# Patient Record
Sex: MALE | Race: ASIAN | NOT HISPANIC OR LATINO | URBAN - METROPOLITAN AREA
[De-identification: names, ages, dates, MRNs, and addresses within clinical notes are randomized per-mention and may not be internally consistent; named-entity substitution may affect disease eponyms.]

---
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
## 2024-01-25 ENCOUNTER — ANCILLARY PROCEDURE (OUTPATIENT)
Dept: NEUROLOGY | Facility: CLINIC | Age: 19
End: 2024-01-25
Payer: COMMERCIAL

## 2024-01-25 DIAGNOSIS — R56.9 SEIZURES (H): ICD-10-CM

## 2024-01-25 PROCEDURE — 95713 VEEG 2-12 HR CONT MNTR: CPT | Performed by: PSYCHIATRY & NEUROLOGY

## 2024-01-25 PROCEDURE — 95718 EEG PHYS/QHP 2-12 HR W/VEEG: CPT | Performed by: PSYCHIATRY & NEUROLOGY

## 2024-01-25 PROCEDURE — 95700 EEG CONT REC W/VID EEG TECH: CPT | Performed by: PSYCHIATRY & NEUROLOGY

## 2024-02-02 ENCOUNTER — PRE VISIT (OUTPATIENT)
Dept: NEUROLOGY | Facility: CLINIC | Age: 19
End: 2024-02-02

## 2024-02-02 ENCOUNTER — APPOINTMENT (OUTPATIENT)
Dept: LAB | Facility: CLINIC | Age: 19
End: 2024-02-02
Payer: COMMERCIAL

## 2024-02-02 ENCOUNTER — OFFICE VISIT (OUTPATIENT)
Dept: NEUROLOGY | Facility: CLINIC | Age: 19
End: 2024-02-02
Attending: INTERNAL MEDICINE
Payer: COMMERCIAL

## 2024-02-02 VITALS
OXYGEN SATURATION: 98 % | HEART RATE: 60 BPM | SYSTOLIC BLOOD PRESSURE: 112 MMHG | RESPIRATION RATE: 18 BRPM | DIASTOLIC BLOOD PRESSURE: 73 MMHG

## 2024-02-02 DIAGNOSIS — G40.909 SEIZURE DISORDER (H): ICD-10-CM

## 2024-02-02 LAB — LEVETIRACETAM SERPL-MCNC: 15.3 ΜG/ML (ref 10–40)

## 2024-02-02 PROCEDURE — 99205 OFFICE O/P NEW HI 60 MIN: CPT | Performed by: PSYCHIATRY & NEUROLOGY

## 2024-02-02 PROCEDURE — 80175 DRUG SCREEN QUAN LAMOTRIGINE: CPT | Mod: 90 | Performed by: PATHOLOGY

## 2024-02-02 PROCEDURE — 99000 SPECIMEN HANDLING OFFICE-LAB: CPT | Performed by: PATHOLOGY

## 2024-02-02 PROCEDURE — G2211 COMPLEX E/M VISIT ADD ON: HCPCS | Performed by: PSYCHIATRY & NEUROLOGY

## 2024-02-02 PROCEDURE — 36415 COLL VENOUS BLD VENIPUNCTURE: CPT | Performed by: PATHOLOGY

## 2024-02-02 PROCEDURE — 80177 DRUG SCRN QUAN LEVETIRACETAM: CPT | Performed by: PSYCHIATRY & NEUROLOGY

## 2024-02-02 RX ORDER — LAMOTRIGINE 25 MG/1
75 TABLET ORAL 2 TIMES DAILY
Qty: 180 TABLET | Refills: 11 | Status: SHIPPED | OUTPATIENT
Start: 2024-02-02 | End: 2024-02-18

## 2024-02-02 RX ORDER — LAMOTRIGINE 50 MG/1
TABLET, ORALLY DISINTEGRATING ORAL 2 TIMES DAILY
COMMUNITY
End: 2024-02-02

## 2024-02-02 RX ORDER — LEVETIRACETAM 500 MG/1
500 TABLET ORAL 2 TIMES DAILY
COMMUNITY
End: 2024-02-02

## 2024-02-02 RX ORDER — LAMOTRIGINE 25 MG/1
75 TABLET ORAL DAILY
Qty: 180 TABLET | Refills: 11 | Status: SHIPPED | OUTPATIENT
Start: 2024-02-02 | End: 2024-02-02

## 2024-02-02 RX ORDER — LEVETIRACETAM 500 MG/1
500 TABLET ORAL 2 TIMES DAILY
Qty: 60 TABLET | Refills: 11 | Status: SHIPPED | OUTPATIENT
Start: 2024-02-02 | End: 2024-05-01

## 2024-02-02 ASSESSMENT — PAIN SCALES - GENERAL: PAINLEVEL: NO PAIN (0)

## 2024-02-02 NOTE — PATIENT INSTRUCTIONS
PLAN:  MRI brain  Continue Keppra 500 mg bid and Lamictal 75 mg bid.  Labs: Keppra, Lamictal  RTC in 3 months.

## 2024-02-02 NOTE — LETTER
2/2/2024       RE: Roslyn Toney  [address]  [facility] [address]       Dear Colleague,    Thank you for referring your patient, Roslyn Toney, to the [facility] NEUROLOGY CLINIC [place] [facility]. Please see a copy of my visit note below.    February 2, 2024    CHIEF COMPLAINT:  Seizures.    HISTORY OF PRESENT ILLNESS:  This patient is a 18 year old years old right handed male presented for evaluation for seizure activities.  Patient is by himself in the clinic today.    Patient started to have seizures at 1-1 years old while he was in [place]. He was put on anti-seizure medications, and his seizures were well controlled at that time. He was taken off AEDs when he was in middle school. Then his seizures recurred in June 2021, when he had a GTC. He had 3-4 seizures between 2021 to 2022. Last seizure with LOC was in Aug. 2022. He was initially put on Keppra 500 mg bid and seizure was still not controlled. Later Lamictal was added and seizures was controlled. He is compliant with the meds. No side effects were observed. He is now taking Keppra 500 mg bid and Lamictal 75 mg bid.    Patient has 2 types of seizures.  Type # 1 is likely absence seizures. These are described as he will star, having behavioral arrest, this will last for 1 min, no post ictal confusion or lethargy. The frequency of this is unclear.  Type # 2 is likely GTCs. They are described as he will make loud noise, lose consciousness, his head will turn to one side, forming in the mouth, with generalized convulsions. This happened 3-4 times from 4451-8316.  The last seizure was in Aug. 2022.    TRIGGERS FOR SEIZURES:    Lack of sleep, flashing lights. Possible stress.    RISK FACTORS FOR SEIZURES:  No history of head trauma with loss of consciousness, no history of CNS infection, no history of febrile convulsions.  No history of brain tumor or stroke. Normal  development.     ALLERGIES:  Patient has no known allergies.    CURRENT MEDICATIONS:  Current Outpatient Medications   Medication Sig Dispense Refill    lamoTRIgine (LAMICTAL) 50 MG TBDP ODT tab Take by mouth 2 times daily      levETIRAcetam (KEPPRA) 500 MG tablet Take 500 mg by mouth 2 times daily         PAST AEDs:  One AEDs during childhood    PAST MEDICAL HISTORY:  Epilepsy    PAST SURGICAL HISTORY:  None    Family History:  History of seizures: None    SOCIAL HISTORY:  Born and raised: [place]   Education: In college now at [employer]  Work: None  Tobacco: None , ETOH: None, Drugs: None    Family: Not , Children: None    REVIEW OF SYSTEMS:   Negative       PHYSICAL EXAMINATIONS:       Blood pressure 112/73, pulse 60, resp. rate 18, SpO2 98%.    General exam: General Appearance:  No acute distress.  HEENT:  Normocephalic, atraumatic.  Neck:  Supple, no lymphadenopathy. Extremities:  No edema.      Neurologic Exam:  Alert and oriented x3.  Speech fluent, appropriate. Normal attention.  Cranial Nerves:  Pupils are equal, round, reactive to light and accomodation.  Extraocular movement intact.  No facial weakness or asymmetry.  Facial sensation was normal.  Tongue and palate midline.  Hearing normal.  Visual field : normal to confrontation.   Motor Exam:  Normal bulk.  Normal tone.  Strength 5/5 in all extremities.  Sensory:  Normal to light touch and vibration in all extremities.  Deep tendon reflexes 2+ bilaterally in both upper and lower extremities.  Coordination:  Finger-to-nose, heel-to-shin exams showed no ataxia.  Rapid alternating movement was normal.  Gait and Station:  normal casual gait.  Normal tandem gait. No difficulties with tip-toe or heel walking.  Romberg sign negative.      PREVIOUS DIAGNOSTIC TESTING:    MRI brain:   Result not available.    EEG: (1/2024)  Impression:  This is abnormal EEG due to the presence of one isolated central spike and wave activities. Findings are consistent with  patient's history of seizure disorder.  This isolated central spike and wave activities may suggest generalized epilepsy. However, focal epilepsy can not be ruled out at this time.       IMPRESSION:     Epilepsy. Probably primary generalized epilepsy. This patient is a 18 year old years old right handed male presented with history of epilepsy since childhood. He had probably absence seizures and GTCs. His seizures are relatively well controlled. He is currently taking Keppra 500 mg bid and Lamictal 75 mg bid.     We would like to have MRI for further evaluation.      PLAN:  MRI brain  Continue Keppra 500 mg bid and Lamictal 75 mg bid.  Labs: Keppra, Lamictal  RTC in 3 months.      60 min total time was spent on the day of this visit.      40 min was spent on face to face time  20 min was spent on preparation of visit to review charts and labs, ordering medications and tests, and documentation of clinical information2        Again, thank you for allowing me to participate in the care of your patient.      Sincerely,    Alonso Natarajan MD

## 2024-02-02 NOTE — PROGRESS NOTES
February 2, 2024    CHIEF COMPLAINT:  Seizures.    HISTORY OF PRESENT ILLNESS:  This patient is a 18 year old years old right handed male presented for evaluation for seizure activities.  Patient is by himself in the clinic today.    Patient started to have seizures at 1-2 years old while he was in [place]. He was put on anti-seizure medications, and his seizures were well controlled at that time. He was taken off AEDs when he was in middle school. Then his seizures recurred in June 2021, when he had a GTC. He had 3-4 seizures between 2021 to 2022. Last seizure with LOC was in Aug. 2022. He was initially put on Keppra 500 mg bid and seizure was still not controlled. Later Lamictal was added and seizures was controlled. He is compliant with the meds. No side effects were observed. He is now taking Keppra 500 mg bid and Lamictal 75 mg bid.    Patient has 2 types of seizures.  Type # 1 is likely absence seizures. These are described as he will star, having behavioral arrest, this will last for 1 min, no post ictal confusion or lethargy. The frequency of this is unclear.  Type # 2 is likely GTCs. They are described as he will make loud noise, lose consciousness, his head will turn to one side, forming in the mouth, with generalized convulsions. This happened 3-4 times from 0587-7605.  The last seizure was in Aug. 2022.    TRIGGERS FOR SEIZURES:    Lack of sleep, flashing lights. Possible stress.    RISK FACTORS FOR SEIZURES:  No history of head trauma with loss of consciousness, no history of CNS infection, no history of febrile convulsions.  No history of brain tumor or stroke. Normal development.     ALLERGIES:  Patient has no known allergies.    CURRENT MEDICATIONS:  Current Outpatient Medications   Medication Sig Dispense Refill    lamoTRIgine (LAMICTAL) 50 MG TBDP ODT tab Take by mouth 2 times daily      levETIRAcetam (KEPPRA) 500 MG tablet Take 500 mg by mouth 2 times daily         PAST AEDs:  One AEDs during  childhood    PAST MEDICAL HISTORY:  Epilepsy    PAST SURGICAL HISTORY:  None    Family History:  History of seizures: None    SOCIAL HISTORY:  Born and raised: [place]   Education: In college now at [employer]  Work: None  Tobacco: None , ETOH: None, Drugs: None    Family: Not , Children: None    REVIEW OF SYSTEMS:   Negative       PHYSICAL EXAMINATIONS:       Blood pressure 112/73, pulse 60, resp. rate 18, SpO2 98%.    General exam: General Appearance:  No acute distress.  HEENT:  Normocephalic, atraumatic.  Neck:  Supple, no lymphadenopathy. Extremities:  No edema.      Neurologic Exam:  Alert and oriented x3.  Speech fluent, appropriate. Normal attention.  Cranial Nerves:  Pupils are equal, round, reactive to light and accomodation.  Extraocular movement intact.  No facial weakness or asymmetry.  Facial sensation was normal.  Tongue and palate midline.  Hearing normal.  Visual field : normal to confrontation.   Motor Exam:  Normal bulk.  Normal tone.  Strength 5/5 in all extremities.  Sensory:  Normal to light touch and vibration in all extremities.  Deep tendon reflexes 2+ bilaterally in both upper and lower extremities.  Coordination:  Finger-to-nose, heel-to-shin exams showed no ataxia.  Rapid alternating movement was normal.  Gait and Station:  normal casual gait.  Normal tandem gait. No difficulties with tip-toe or heel walking.  Romberg sign negative.      PREVIOUS DIAGNOSTIC TESTING:    MRI brain:   Result not available.    EEG: (1/2024)  Impression:  This is abnormal EEG due to the presence of one isolated central spike and wave activities. Findings are consistent with patient's history of seizure disorder.  This isolated central spike and wave activities may suggest generalized epilepsy. However, focal epilepsy can not be ruled out at this time.       IMPRESSION:     Epilepsy. Probably primary generalized epilepsy. This patient is a 18 year old years old right handed male presented with history of  epilepsy since childhood. He had probably absence seizures and GTCs. His seizures are relatively well controlled. He is currently taking Keppra 500 mg bid and Lamictal 75 mg bid.     We would like to have MRI for further evaluation.      PLAN:  MRI brain  Continue Keppra 500 mg bid and Lamictal 75 mg bid.  Labs: Keppra, Lamictal  RTC in 3 months.        Alonso Natarajan MD      60 min total time was spent on the day of this visit.      40 min was spent on face to face time  20 min was spent on preparation of visit to review charts and labs, ordering medications and tests, and documentation of clinical information2

## 2024-02-04 LAB — LAMOTRIGINE SERPL-MCNC: 4.9 UG/ML

## 2024-02-18 ENCOUNTER — TELEPHONE (OUTPATIENT)
Dept: NEUROLOGY | Facility: CLINIC | Age: 19
End: 2024-02-18
Payer: COMMERCIAL

## 2024-02-18 DIAGNOSIS — G40.909 SEIZURE DISORDER (H): Primary | ICD-10-CM

## 2024-02-18 RX ORDER — LAMOTRIGINE 100 MG/1
100 TABLET ORAL DAILY
Qty: 180 TABLET | Refills: 3 | Status: SHIPPED | OUTPATIENT
Start: 2024-02-18 | End: 2024-05-01

## 2024-02-18 NOTE — TELEPHONE ENCOUNTER
Patient called. He had another spell of LOC last night. He woke up on the floor. He was sleeping late last night. This happed at 1:40 am. He usually sleeps at 12:00.    Recommendations:  Increase Lamictal 100 mg bid.  Continue Keppra 500 mg bid

## 2024-02-28 ENCOUNTER — ANCILLARY PROCEDURE (OUTPATIENT)
Dept: MRI IMAGING | Facility: CLINIC | Age: 19
End: 2024-02-28
Attending: PSYCHIATRY & NEUROLOGY
Payer: COMMERCIAL

## 2024-02-28 PROCEDURE — A9585 GADOBUTROL INJECTION: HCPCS | Performed by: RADIOLOGY

## 2024-02-28 PROCEDURE — 70553 MRI BRAIN STEM W/O & W/DYE: CPT | Mod: GC | Performed by: RADIOLOGY

## 2024-02-28 RX ORDER — GADOBUTROL 604.72 MG/ML
7.5 INJECTION INTRAVENOUS ONCE
Status: COMPLETED | OUTPATIENT
Start: 2024-02-28 | End: 2024-02-28

## 2024-02-28 RX ADMIN — GADOBUTROL 6 ML: 604.72 INJECTION INTRAVENOUS at 18:45

## 2024-03-10 ENCOUNTER — HEALTH MAINTENANCE LETTER (OUTPATIENT)
Age: 19
End: 2024-03-10

## 2024-05-01 ENCOUNTER — APPOINTMENT (OUTPATIENT)
Dept: LAB | Facility: CLINIC | Age: 19
End: 2024-05-01
Payer: COMMERCIAL

## 2024-05-01 ENCOUNTER — OFFICE VISIT (OUTPATIENT)
Dept: NEUROLOGY | Facility: CLINIC | Age: 19
End: 2024-05-01
Payer: COMMERCIAL

## 2024-05-01 VITALS
OXYGEN SATURATION: 100 % | DIASTOLIC BLOOD PRESSURE: 75 MMHG | HEART RATE: 72 BPM | WEIGHT: 139 LBS | SYSTOLIC BLOOD PRESSURE: 116 MMHG

## 2024-05-01 DIAGNOSIS — G40.909 SEIZURE DISORDER (H): Primary | ICD-10-CM

## 2024-05-01 PROCEDURE — 80177 DRUG SCRN QUAN LEVETIRACETAM: CPT | Performed by: PSYCHIATRY & NEUROLOGY

## 2024-05-01 PROCEDURE — 80175 DRUG SCREEN QUAN LAMOTRIGINE: CPT | Mod: 90 | Performed by: PATHOLOGY

## 2024-05-01 PROCEDURE — 36415 COLL VENOUS BLD VENIPUNCTURE: CPT | Performed by: PATHOLOGY

## 2024-05-01 PROCEDURE — 99000 SPECIMEN HANDLING OFFICE-LAB: CPT | Performed by: PATHOLOGY

## 2024-05-01 PROCEDURE — 99213 OFFICE O/P EST LOW 20 MIN: CPT | Performed by: PSYCHIATRY & NEUROLOGY

## 2024-05-01 PROCEDURE — G2211 COMPLEX E/M VISIT ADD ON: HCPCS | Performed by: PSYCHIATRY & NEUROLOGY

## 2024-05-01 RX ORDER — LAMOTRIGINE 100 MG/1
100 TABLET ORAL DAILY
Qty: 180 TABLET | Refills: 3 | Status: SHIPPED | OUTPATIENT
Start: 2024-05-01 | End: 2024-09-05

## 2024-05-01 RX ORDER — LEVETIRACETAM 500 MG/1
500 TABLET ORAL 2 TIMES DAILY
Qty: 180 TABLET | Refills: 3 | Status: SHIPPED | OUTPATIENT
Start: 2024-05-01 | End: 2024-09-05

## 2024-05-01 NOTE — PATIENT INSTRUCTIONS
PLAN:  Continue Keppra 500 mg bid and Lamictal 100 mg bid.  Labs: Keppra, Lamictal  RTC in 3 months.

## 2024-05-01 NOTE — PROGRESS NOTES
CHIEF COMPLAINT:  Seizures.    HISTORY OF PRESENT ILLNESS:  In person follow up.  This patient is a 18 year old years old right handed male with history of seizures.  Patient is by himself in the clinic today.    He was last seen 2 1/2 months ago.  He had another seizure on 2/18/2024. He was sitting at the desk, about to go to sleep. Then he woke up on the floor, feeling fatigued, confused, headaches, then he vomited. He felt body muscle soreness for the next few days. He he denied missing medications. He did have some sleep deprivation and more stress at that time. His Lamictal was increased to 100 mg bid since then.  He is now on Keppra 500 mg bid, and Lamictal 100 mg bid. His last seizure before this was in Aug. 2022.    Patient started to have seizures at 1-2 years old while he was in [place]. He was put on anti-seizure medications, and his seizures were well controlled at that time. He was taken off AEDs when he was in middle school. Then his seizures recurred in June 2021, when he had a GTC. He had 3-4 seizures between 2021 to 2022. Last seizure with LOC was in Aug. 2022. He was initially put on Keppra 500 mg bid and seizure was still not controlled. Later Lamictal was added and seizures was controlled. He is compliant with the meds. No side effects were observed. He is now taking Keppra 500 mg bid and Lamictal 75 mg bid.    Patient has 2 types of seizures.  Type # 1 is likely absence seizures. These are described as he will star, having behavioral arrest, this will last for 1 min, no post ictal confusion or lethargy. The frequency of this is unclear.  Type # 2 is likely GTCs. They are described as he will make loud noise, lose consciousness, his head will turn to one side, forming in the mouth, with generalized convulsions. This happened 3-4 times from 3117-7535.  The last seizure was in Aug. 2022.    TRIGGERS FOR SEIZURES:    Lack of sleep, flashing lights. Possible stress.    RISK FACTORS FOR SEIZURES:  No  history of head trauma with loss of consciousness, no history of CNS infection, no history of febrile convulsions.  No history of brain tumor or stroke. Normal development.     ALLERGIES:  Patient has no known allergies.    CURRENT MEDICATIONS:  Current Outpatient Medications   Medication Sig Dispense Refill    lamoTRIgine (LAMICTAL) 100 MG tablet Take 1 tablet (100 mg) by mouth daily 180 tablet 3    levETIRAcetam (KEPPRA) 500 MG tablet Take 1 tablet (500 mg) by mouth 2 times daily 60 tablet 11       PAST AEDs:  One AEDs during childhood    PAST MEDICAL HISTORY:  Epilepsy    PAST SURGICAL HISTORY:  None    Family History:  History of seizures: None    SOCIAL HISTORY:  Born and raised: [place]   Education: In college now at [employer], studying computer science.  Work: None  Tobacco: None , ETOH: None, Drugs: None    Family: Not , Children: None    REVIEW OF SYSTEMS:   Negative       PHYSICAL EXAMINATIONS:       Blood pressure 116/75, pulse 72, weight 139 lb (63 kg), SpO2 100%.    General exam: General Appearance: No acute distress. HEENT: Normocephalic, atraumatic. Neck: Supple.  Extremities: No edema.     Neurologic Exam: Alert and oriented x3. Speech fluent, appropriate. Normal attention. Cranial Nerves: Pupils are equal, round, reactive to light and accomodation. Extraocular movement intact. No facial weakness or asymmetry. Hearing normal. Motor Exam: Normal. Coordination:no ataxia.  Gait and Station: normal.      PREVIOUS DIAGNOSTIC TESTING:    MRI brain:   Result not available.    EEG: (1/2024)  Impression:  This is abnormal EEG due to the presence of one isolated central spike and wave activities. Findings are consistent with patient's history of seizure disorder.  This isolated central spike and wave activities may suggest generalized epilepsy. However, focal epilepsy can not be ruled out at this time.       IMPRESSION:     Epilepsy. Probably primary generalized epilepsy.     He was last seen 2 1/2 months  ago.  He had another seizure on 2/18/2024. He was sitting at the desk, about to go to sleep. Then he woke up on the floor, feeling fatigued, confused, headaches, then he vomited. He felt body muscle soreness for the next few days. He he denied missing medications. He did have some sleep deprivation and more stress at that time. His Lamictal was increased to 100 mg bid since then.  He is now on Keppra 500 mg bid, and Lamictal 100 mg bid.      PLAN:  Continue Keppra 500 mg bid and Lamictal 100 mg bid.  Labs: Keppra, Lamictal  RTC in 3 months.      The longitudinal plan of care for seizures was addressed during this visit. Due to the added complexity in care, I will continue to support this patient in the subsequent management of this condition and with the ongoing continuity of care of this condition.       24 min total time was spent on the day of this visit     16 min was spent on face to face time  8 min was spent on preparation of visit to review charts and labs, ordering medications and tests, and documentation of clinical information

## 2024-05-01 NOTE — LETTER
5/1/2024       RE: Roslyn Toney  [address]  [facility] [address]     Dear Colleague,    Thank you for referring your patient, Roslyn Toney, to the [facility] NEUROLOGY CLINIC [place] at [facility]. Please see a copy of my visit note below.    CHIEF COMPLAINT:  Seizures.    HISTORY OF PRESENT ILLNESS:  In person follow up.  This patient is a 18 year old years old right handed male with history of seizures.  Patient is by himself in the clinic today.    He was last seen 2 1/2 months ago.  He had another seizure on 2/18/2024. He was sitting at the desk, about to go to sleep. Then he woke up on the floor, feeling fatigued, confused, headaches, then he vomited. He felt body muscle soreness for the next few days. He he denied missing medications. He did have some sleep deprivation and more stress at that time. His Lamictal was increased to 100 mg bid since then.  He is now on Keppra 500 mg bid, and Lamictal 100 mg bid. His last seizure before this was in Aug. 2022.    Patient started to have seizures at 1-2 years old while he was in [place]. He was put on anti-seizure medications, and his seizures were well controlled at that time. He was taken off AEDs when he was in middle school. Then his seizures recurred in June 2021, when he had a GTC. He had 3-4 seizures between 2021 to 2022. Last seizure with LOC was in Aug. 2022. He was initially put on Keppra 500 mg bid and seizure was still not controlled. Later Lamictal was added and seizures was controlled. He is compliant with the meds. No side effects were observed. He is now taking Keppra 500 mg bid and Lamictal 75 mg bid.    Patient has 2 types of seizures.  Type # 1 is likely absence seizures. These are described as he will star, having behavioral arrest, this will last for 1 min, no post ictal confusion or lethargy. The frequency of this is unclear.  Type # 2 is likely GTCs. They are described  as he will make loud noise, lose consciousness, his head will turn to one side, forming in the mouth, with generalized convulsions. This happened 3-4 times from 5322-0806.  The last seizure was in Aug. 2022.    TRIGGERS FOR SEIZURES:    Lack of sleep, flashing lights. Possible stress.    RISK FACTORS FOR SEIZURES:  No history of head trauma with loss of consciousness, no history of CNS infection, no history of febrile convulsions.  No history of brain tumor or stroke. Normal development.     ALLERGIES:  Patient has no known allergies.    CURRENT MEDICATIONS:  Current Outpatient Medications   Medication Sig Dispense Refill    lamoTRIgine (LAMICTAL) 100 MG tablet Take 1 tablet (100 mg) by mouth daily 180 tablet 3    levETIRAcetam (KEPPRA) 500 MG tablet Take 1 tablet (500 mg) by mouth 2 times daily 60 tablet 11       PAST AEDs:  One AEDs during childhood    PAST MEDICAL HISTORY:  Epilepsy    PAST SURGICAL HISTORY:  None    Family History:  History of seizures: None    SOCIAL HISTORY:  Born and raised: [place]   Education: In college now at [employer], studying computer science.  Work: None  Tobacco: None , ETOH: None, Drugs: None    Family: Not , Children: None    REVIEW OF SYSTEMS:   Negative       PHYSICAL EXAMINATIONS:       Blood pressure 116/75, pulse 72, weight 139 lb (63 kg), SpO2 100%.    General exam: General Appearance: No acute distress. HEENT: Normocephalic, atraumatic. Neck: Supple.  Extremities: No edema.     Neurologic Exam: Alert and oriented x3. Speech fluent, appropriate. Normal attention. Cranial Nerves: Pupils are equal, round, reactive to light and accomodation. Extraocular movement intact. No facial weakness or asymmetry. Hearing normal. Motor Exam: Normal. Coordination:no ataxia.  Gait and Station: normal.      PREVIOUS DIAGNOSTIC TESTING:    MRI brain:   Result not available.    EEG: (1/2024)  Impression:  This is abnormal EEG due to the presence of one isolated central spike and wave  activities. Findings are consistent with patient's history of seizure disorder.  This isolated central spike and wave activities may suggest generalized epilepsy. However, focal epilepsy can not be ruled out at this time.       IMPRESSION:     Epilepsy. Probably primary generalized epilepsy.     He was last seen 2 1/2 months ago.  He had another seizure on 2/18/2024. He was sitting at the desk, about to go to sleep. Then he woke up on the floor, feeling fatigued, confused, headaches, then he vomited. He felt body muscle soreness for the next few days. He he denied missing medications. He did have some sleep deprivation and more stress at that time. His Lamictal was increased to 100 mg bid since then.  He is now on Keppra 500 mg bid, and Lamictal 100 mg bid.      PLAN:  Continue Keppra 500 mg bid and Lamictal 100 mg bid.  Labs: Keppra, Lamictal  RTC in 3 months.      The longitudinal plan of care for seizures was addressed during this visit. Due to the added complexity in care, I will continue to support this patient in the subsequent management of this condition and with the ongoing continuity of care of this condition.       24 min total time was spent on the day of this visit     16 min was spent on face to face time  8 min was spent on preparation of visit to review charts and labs, ordering medications and tests, and documentation of clinical information      Again, thank you for allowing me to participate in the care of your patient.      Sincerely,    Alonso Natarajan MD

## 2024-05-02 LAB
LAMOTRIGINE SERPL-MCNC: 7.3 UG/ML
LEVETIRACETAM SERPL-MCNC: 15.1 ΜG/ML (ref 10–40)

## 2024-09-05 ENCOUNTER — VIRTUAL VISIT (OUTPATIENT)
Dept: NEUROLOGY | Facility: CLINIC | Age: 19
End: 2024-09-05
Payer: COMMERCIAL

## 2024-09-05 DIAGNOSIS — G40.909 SEIZURE DISORDER (H): ICD-10-CM

## 2024-09-05 RX ORDER — LEVETIRACETAM 500 MG/1
500 TABLET ORAL 2 TIMES DAILY
Qty: 180 TABLET | Refills: 3 | Status: SHIPPED | OUTPATIENT
Start: 2024-09-05 | End: 2024-09-08

## 2024-09-05 RX ORDER — LAMOTRIGINE 100 MG/1
100 TABLET ORAL 2 TIMES DAILY
Qty: 180 TABLET | Refills: 3 | Status: SHIPPED | OUTPATIENT
Start: 2024-09-05

## 2024-09-05 NOTE — PATIENT INSTRUCTIONS
PLAN:  Continue Keppra 500 mg bid and Lamictal 100 mg bid.  Labs: Keppra, Lamictal.  RTC in 12 months.

## 2024-09-05 NOTE — PROGRESS NOTES
CHIEF COMPLAINT:  Seizures.    HISTORY OF PRESENT ILLNESS:  Video call for follow up.  This patient is a 18 year old years old right handed male with history of seizures.  Patient is by himself during the video call today.    Since the last visit, he had no seizures. His last seizure was on 2/18/2024, which was probably a GTC. He he denied missing medications. He did have some sleep deprivation and more stress at that time. His Lamictal was increased.  He is now on Keppra 500 mg bid, and Lamictal 100 mg bid. He is compliant with the meds, no side effects were reported. His last seizure before this was in Aug. 2022.    Patient started to have seizures at 1-2 years old while he was in [place]. He was put on anti-seizure medications, and his seizures were well controlled at that time. He was taken off AEDs when he was in middle school. Then his seizures recurred in June 2021, when he had a GTC. He had 3-4 seizures between 2021 to 2022. Last seizure with LOC was in Aug. 2022. He was initially put on Keppra 500 mg bid and seizure was still not controlled. Later Lamictal was added and seizures was controlled. He is compliant with the meds. No side effects were observed. He is now taking Keppra 500 mg bid and Lamictal 75 mg bid.    Patient has 2 types of seizures.  Type # 1 is likely absence seizures. These are described as he will star, having behavioral arrest, this will last for 1 min, no post ictal confusion or lethargy. The frequency of this is unclear.  Type # 2 is likely GTCs. They are described as he will make loud noise, lose consciousness, his head will turn to one side, forming in the mouth, with generalized convulsions. This happened 3-4 times from 0531-2596.  The last seizure was in Aug. 2022.    TRIGGERS FOR SEIZURES:    Lack of sleep, flashing lights. Possible stress.    RISK FACTORS FOR SEIZURES:  No history of head trauma with loss of consciousness, no history of CNS infection, no history of febrile  convulsions.  No history of brain tumor or stroke. Normal development.     ALLERGIES:  Patient has no known allergies.    CURRENT MEDICATIONS:  Current Outpatient Medications   Medication Sig Dispense Refill    lamoTRIgine (LAMICTAL) 100 MG tablet Take 1 tablet (100 mg) by mouth daily (Patient taking differently: Take 100 mg by mouth 2 times daily.) 180 tablet 3    levETIRAcetam (KEPPRA) 500 MG tablet Take 1 tablet (500 mg) by mouth 2 times daily 180 tablet 3       PAST AEDs:  One AEDs during childhood    PAST MEDICAL HISTORY:  Epilepsy    PAST SURGICAL HISTORY:  None    Family History:  History of seizures: None    SOCIAL HISTORY:  Born and raised: [place]   Education: In college now at [employer], studying computer science.  Work: None  Tobacco: None , ETOH: None, Drugs: None    Family: Not , Children: None    REVIEW OF SYSTEMS:   Negative       PHYSICAL EXAMINATIONS:       There were no vitals taken for this visit.    AAO x 3, speech fluent and appropriate. No facial weakness, hearing normal.      PREVIOUS DIAGNOSTIC TESTING:    MRI brain: (02/02/2024)  IMPRESSION:  1. No temporal lobe abnormality, mass, or specific epileptogenic focus  identified as a cause of the patient's seizures.  2. No abnormal enhancement intracranially.    EEG: (1/2024)  Impression:  This is abnormal EEG due to the presence of one isolated central spike and wave activities. Findings are consistent with patient's history of seizure disorder.  This isolated central spike and wave activities may suggest generalized epilepsy. However, focal epilepsy can not be ruled out at this time.       IMPRESSION:     Epilepsy. Probably primary generalized epilepsy.     Since the last visit, he had no seizures. His last seizure was on 2/18/2024, which was probably a GTC. He he denied missing medications. He did have some sleep deprivation and more stress at that time. His Lamictal was increased.  He is now on Keppra 500 mg bid, and Lamictal 100 mg bid.  He is compliant with the meds, no side effects were reported.    PLAN:  Continue Keppra 500 mg bid and Lamictal 100 mg bid.  Labs: Keppra, Lamictal.  RTC in 12 months.      The longitudinal plan of care for seizures was addressed during this visit. Due to the added complexity in care, I will continue to support this patient in the subsequent management of this condition and with the ongoing continuity of care of this condition.       25 min total time was spent on the day of this visit.      15 min was spent on face to face time  10 min was spent on preparation of visit to review charts and labs, ordering medications and tests, and documentation of clinical information

## 2024-09-05 NOTE — NURSING NOTE
Is the patient currently in the state of [place]? YES    Visit mode:VIDEO    If the visit is dropped, the patient can be reconnected by: TELEPHONE VISIT: Phone number: [phone]    Will anyone else be joining the visit? NO      How would you like to obtain your AVS? MyChart    Are changes needed to the allergy or medication list? NO    Reason for visit: Follow Up

## 2024-09-05 NOTE — LETTER
2024       RE: Roslyn Toney  : 2005   MRN: [id number]      Dear Colleague,    Thank you for referring your patient, Roslyn Toney, to the [facility] at [facility]. Please see a copy of my visit note below.    CHIEF COMPLAINT:  Seizures.    HISTORY OF PRESENT ILLNESS:  Video call for follow up.  This patient is a 18 year old years old right handed male with history of seizures.  Patient is by himself during the video call today.    Since the last visit, he had no seizures. His last seizure was on 2024, which was probably a GTC. He he denied missing medications. He did have some sleep deprivation and more stress at that time. His Lamictal was increased.  He is now on Keppra 500 mg bid, and Lamictal 100 mg bid. He is compliant with the meds, no side effects were reported. His last seizure before this was in Aug. 2022.    Patient started to have seizures at 1-2 years old while he was in [place]. He was put on anti-seizure medications, and his seizures were well controlled at that time. He was taken off AEDs when he was in middle school. Then his seizures recurred in 2021, when he had a GTC. He had 3-4 seizures between  to . Last seizure with LOC was in Aug. 2022. He was initially put on Keppra 500 mg bid and seizure was still not controlled. Later Lamictal was added and seizures was controlled. He is compliant with the meds. No side effects were observed. He is now taking Keppra 500 mg bid and Lamictal 75 mg bid.    Patient has 2 types of seizures.  Type # 1 is likely absence seizures. These are described as he will star, having behavioral arrest, this will last for 1 min, no post ictal confusion or lethargy. The frequency of this is unclear.  Type # 2 is likely GTCs. They are described as he will make loud noise, lose consciousness, his head will turn to one side, forming in the mouth, with generalized convulsions. This  happened 3-4 times from 9911-2721.  The last seizure was in Aug. 2022.    TRIGGERS FOR SEIZURES:    Lack of sleep, flashing lights. Possible stress.    RISK FACTORS FOR SEIZURES:  No history of head trauma with loss of consciousness, no history of CNS infection, no history of febrile convulsions.  No history of brain tumor or stroke. Normal development.     ALLERGIES:  Patient has no known allergies.    CURRENT MEDICATIONS:  Current Outpatient Medications   Medication Sig Dispense Refill     lamoTRIgine (LAMICTAL) 100 MG tablet Take 1 tablet (100 mg) by mouth daily (Patient taking differently: Take 100 mg by mouth 2 times daily.) 180 tablet 3     levETIRAcetam (KEPPRA) 500 MG tablet Take 1 tablet (500 mg) by mouth 2 times daily 180 tablet 3       PAST AEDs:  One AEDs during childhood    PAST MEDICAL HISTORY:  Epilepsy    PAST SURGICAL HISTORY:  None    Family History:  History of seizures: None    SOCIAL HISTORY:  Born and raised: [place]   Education: In college now at [employer], studying computer science.  Work: None  Tobacco: None , ETOH: None, Drugs: None    Family: Not , Children: None    REVIEW OF SYSTEMS:   Negative       PHYSICAL EXAMINATIONS:       There were no vitals taken for this visit.    AAO x 3, speech fluent and appropriate. No facial weakness, hearing normal.      PREVIOUS DIAGNOSTIC TESTING:    MRI brain: (02/02/2024)  IMPRESSION:  1. No temporal lobe abnormality, mass, or specific epileptogenic focus  identified as a cause of the patient's seizures.  2. No abnormal enhancement intracranially.    EEG: (1/2024)  Impression:  This is abnormal EEG due to the presence of one isolated central spike and wave activities. Findings are consistent with patient's history of seizure disorder.  This isolated central spike and wave activities may suggest generalized epilepsy. However, focal epilepsy can not be ruled out at this time.       IMPRESSION:     Epilepsy. Probably primary generalized epilepsy.      Since the last visit, he had no seizures. His last seizure was on 2/18/2024, which was probably a GTC. He he denied missing medications. He did have some sleep deprivation and more stress at that time. His Lamictal was increased.  He is now on Keppra 500 mg bid, and Lamictal 100 mg bid. He is compliant with the meds, no side effects were reported.    PLAN:  Continue Keppra 500 mg bid and Lamictal 100 mg bid.  Labs: Keppra, Lamictal.  RTC in 12 months.      The longitudinal plan of care for seizures was addressed during this visit. Due to the added complexity in care, I will continue to support this patient in the subsequent management of this condition and with the ongoing continuity of care of this condition.       25 min total time was spent on the day of this visit.      15 min was spent on face to face time  10 min was spent on preparation of visit to review charts and labs, ordering medications and tests, and documentation of clinical information      Again, thank you for allowing me to participate in the care of your patient.      Sincerely,    Alonso Natarajan MD

## 2024-09-06 ENCOUNTER — LAB (OUTPATIENT)
Dept: LAB | Facility: CLINIC | Age: 19
End: 2024-09-06
Payer: COMMERCIAL

## 2024-09-06 ENCOUNTER — TELEPHONE (OUTPATIENT)
Dept: PSYCHIATRY | Facility: CLINIC | Age: 19
End: 2024-09-06

## 2024-09-06 DIAGNOSIS — G40.909 SEIZURE DISORDER (H): ICD-10-CM

## 2024-09-06 PROCEDURE — 80175 DRUG SCREEN QUAN LAMOTRIGINE: CPT | Mod: 90 | Performed by: PATHOLOGY

## 2024-09-06 PROCEDURE — 80177 DRUG SCRN QUAN LEVETIRACETAM: CPT | Performed by: PSYCHIATRY & NEUROLOGY

## 2024-09-06 PROCEDURE — 36415 COLL VENOUS BLD VENIPUNCTURE: CPT | Performed by: PATHOLOGY

## 2024-09-06 PROCEDURE — 99000 SPECIMEN HANDLING OFFICE-LAB: CPT | Performed by: PATHOLOGY

## 2024-09-06 NOTE — TELEPHONE ENCOUNTER
Copying staff message from Precious Wayne at [facility]:     This patient is requesting a nurse call as soon as possible. He came into the [facility] today (9/6) to try to speak with a nurse or Dr. Natarajan regarding his seizure medications. He recently had labs for this but reports having a seizure last night and wants to know about medication management until he can see Dr. Natarajan again.   No seizure nurses or providers were available in clinic today to speak with him.     Call placed to patient. He reports that he had a seizure last night. Was not able to describe what kind of seizure he had (sounds like GTC) But that he lost consciousness and he fell down and bit his lip. He was in the bathroom before going to bed. Denies any missed medications. Denies hitting his head. He wanted to know if he needed to make any medication changes.     It looks like patient did get labs drawn today and results are still processing. RN informed patient that we will keep an eye out for those labs to results and that it will be up to Dr. Natarajan on whether he wants to make any medication changes. Patient verbalized understanding and was given clinic phone number.     Patient feels mostly back to normal today. He explains that he has a headache and he says his lip hurts. Patient was encouraged to use ice as needed, take OTC pain medication as directed for headache, drink plenty of fluids, and to rest. To call clinic back if symptoms change. Patient verbalized understanding of this and had no further questions.     Routing to provider.

## 2024-09-07 LAB — LEVETIRACETAM SERPL-MCNC: 11 ΜG/ML (ref 10–40)

## 2024-09-08 LAB — LAMOTRIGINE SERPL-MCNC: 5.9 UG/ML

## 2024-09-08 RX ORDER — LEVETIRACETAM 500 MG/1
TABLET ORAL
Qty: 270 TABLET | Refills: 3 | Status: SHIPPED | OUTPATIENT
Start: 2024-09-08

## 2024-09-09 ENCOUNTER — TELEPHONE (OUTPATIENT)
Dept: NEUROLOGY | Facility: CLINIC | Age: 19
End: 2024-09-09

## 2024-09-09 NOTE — TELEPHONE ENCOUNTER
Will increase Keppra to 750 mg bid. Called patient over the phone. Patient voiced understanding of the plan. Will consider increase more in the future. Will see patient in 3 months. Patient will call clinic to make appointment.

## 2024-09-09 NOTE — TELEPHONE ENCOUNTER
Patient is scheduled for 01/17/2024 at 345 to see MD. Was told to see MD asap patient wanted to make sure MD was OK with this appointment time as patient had an episode and MD wanted to see him.     Could patient a call back to confirm that this appointment time will be OK.     Phone number verified

## 2025-03-16 ENCOUNTER — HEALTH MAINTENANCE LETTER (OUTPATIENT)
Age: 20
End: 2025-03-16